# Patient Record
Sex: FEMALE | Race: WHITE
[De-identification: names, ages, dates, MRNs, and addresses within clinical notes are randomized per-mention and may not be internally consistent; named-entity substitution may affect disease eponyms.]

---

## 2018-11-27 ENCOUNTER — HOSPITAL ENCOUNTER (OUTPATIENT)
Age: 33
End: 2018-11-27
Payer: COMMERCIAL

## 2018-11-27 DIAGNOSIS — Z36.0: Primary | ICD-10-CM

## 2018-11-27 PROCEDURE — 76811 OB US DETAILED SNGL FETUS: CPT

## 2019-01-09 ENCOUNTER — HOSPITAL ENCOUNTER (OUTPATIENT)
Age: 34
End: 2019-01-09
Payer: COMMERCIAL

## 2019-01-09 DIAGNOSIS — Z34.90: Primary | ICD-10-CM

## 2019-01-09 PROCEDURE — 82947 ASSAY GLUCOSE BLOOD QUANT: CPT

## 2019-01-09 PROCEDURE — 36415 COLL VENOUS BLD VENIPUNCTURE: CPT

## 2019-03-14 ENCOUNTER — HOSPITAL ENCOUNTER (OUTPATIENT)
Age: 34
End: 2019-03-14
Payer: COMMERCIAL

## 2019-03-14 DIAGNOSIS — Z34.90: Primary | ICD-10-CM

## 2019-03-14 PROCEDURE — 86403 PARTICLE AGGLUT ANTBDY SCRN: CPT

## 2024-05-09 ENCOUNTER — TRANSCRIBE ORDERS (OUTPATIENT)
Dept: OBSTETRICS AND GYNECOLOGY | Facility: HOSPITAL | Age: 39
End: 2024-05-09
Payer: COMMERCIAL

## 2024-05-09 DIAGNOSIS — O09.522 MULTIGRAVIDA OF ADVANCED MATERNAL AGE IN SECOND TRIMESTER: ICD-10-CM

## 2024-05-09 DIAGNOSIS — Z34.90 PREGNANCY, UNSPECIFIED GESTATIONAL AGE: ICD-10-CM

## 2024-05-09 DIAGNOSIS — O35.9XX0 PREGNANCY AFFECTED BY MULTIPLE CONGENITAL ANOMALIES OF FETUS, SINGLE OR UNSPECIFIED FETUS: Primary | ICD-10-CM

## 2024-05-09 DIAGNOSIS — N96 RECURRENT PREGNANCY LOSS: ICD-10-CM

## 2024-05-13 ENCOUNTER — OFFICE VISIT (OUTPATIENT)
Dept: OBSTETRICS AND GYNECOLOGY | Facility: HOSPITAL | Age: 39
End: 2024-05-13
Payer: COMMERCIAL

## 2024-05-13 ENCOUNTER — HOSPITAL ENCOUNTER (OUTPATIENT)
Dept: WOMENS IMAGING | Facility: HOSPITAL | Age: 39
Discharge: HOME | End: 2024-05-13
Admitting: OBSTETRICS & GYNECOLOGY
Payer: COMMERCIAL

## 2024-05-13 VITALS
WEIGHT: 204 LBS | HEIGHT: 68 IN | DIASTOLIC BLOOD PRESSURE: 64 MMHG | BODY MASS INDEX: 30.92 KG/M2 | SYSTOLIC BLOOD PRESSURE: 132 MMHG

## 2024-05-13 DIAGNOSIS — O09.522 MULTIGRAVIDA OF ADVANCED MATERNAL AGE IN SECOND TRIMESTER: ICD-10-CM

## 2024-05-13 DIAGNOSIS — N96 RECURRENT PREGNANCY LOSS: ICD-10-CM

## 2024-05-13 DIAGNOSIS — O35.9XX0 PREGNANCY AFFECTED BY MULTIPLE CONGENITAL ANOMALIES OF FETUS, SINGLE OR UNSPECIFIED FETUS: ICD-10-CM

## 2024-05-13 DIAGNOSIS — Q89.7 MULTIPLE CONGENITAL ANOMALIES: ICD-10-CM

## 2024-05-13 DIAGNOSIS — O02.1 MISSED ABORTION: Primary | ICD-10-CM

## 2024-05-13 DIAGNOSIS — Z34.90 PREGNANCY, UNSPECIFIED GESTATIONAL AGE: ICD-10-CM

## 2024-05-13 PROCEDURE — 76815 OB US LIMITED FETUS(S): CPT | Performed by: OBSTETRICS & GYNECOLOGY

## 2024-05-13 PROCEDURE — 99203 OFFICE O/P NEW LOW 30 MIN: CPT | Performed by: OBSTETRICS & GYNECOLOGY

## 2024-05-13 PROCEDURE — 76815 OB US LIMITED FETUS(S): CPT

## 2024-05-13 RX ORDER — PRENATAL VIT/IRON FUM/FOLIC AC 27MG-0.8MG
TABLET ORAL DAILY
COMMUNITY

## 2024-05-13 NOTE — ASSESSMENT & PLAN NOTE
Unfortunately, today's ultrasound revealed a missed  with fetal size approximately 15 weeks 6 days overall (BPD 17 weeks 3 days). While the anatomic survey is highly limited, I am able to appreciate what I believe to be a cystic hygroma and pleural effusions. The long bones also appear shortened compared to head measurements (3 week lag).   I reviewed with the patient the risks and benefits of D&E vs IOL including failed IOL, retained placenta, hemorrhage, infection, surgical and anesthesia complications, inability to view infant with D&E. I am happy to assist with a D&E should that be what the patient desires. For now she is considering her options. She plans to travel for the next 3 days which I think is medically reasonable as long as she is in an area with access to medical care.   Regardless of method for management of missed Ab, I recommend sending a microarray  (ANORA miscarriage kit) for evaluation of fetal genetics. This can be sent from placenta/cord sample.   Happy to assist in any way.

## 2024-05-13 NOTE — LETTER
"May 13, 2024       No Recipients    Patient: Dora Walter   YOB: 1985   Date of Visit: 2024       Dear Rickie Calhoun MD,    Thank you for referring Dora Walter to me for evaluation. Below is a copy of my consult note.    If you have questions, please do not hesitate to call me. I look forward to following Dora along with you.         Sincerely,        Kathrin Mcgill MD        CC:   No Recipients        Maternal/Fetal Medicine New Patient Note     Name: Dora Walter    : 1985     MRN: [id number]     Referring Provider: Rickie Calhoun MD    Chief Complaint  multiple fetal anomalies    Subjective     History of Present Illness:  Dora Walter is a 38 y.o.  19w4d who presents today for evaluation in the setting of multiple congenital anomalies noted on outside ultrasound   NIPS previously declined     ODELL: Estimated Date of Delivery: 10/3/24     ROS:   As noted in HPI.     Past Medical History:   Diagnosis Date   • Hepatitis C antibody test negative     MOB Hep C and HepBsAg nonreactive; FOB recent diagnosis of Hepatitis C, \"low\" viral load   • History of congenital heart defect     MOB- went for repair at 25 yo- had closed, no surgery needed   • History of recurrent , currently pregnant     SAB x ~ 7   • History of stroke     with previous pregnancy, on lovenox/heparin; suspected T18, normal at birth   • History of transfusion     hemorrhage after miscarriage, received blood      Past Surgical History:   Procedure Laterality Date   • DILATATION AND CURETTAGE        OB History          16    Para   8    Term   8       0    AB   7    Living   8         SAB   7    IAB   0    Ectopic   0    Molar   0    Multiple   0    Live Births   8          Obstetric Comments   Fob#1 pregnancy #1-17               Current Outpatient Medications:   •  Prenatal Vit-Fe Fumarate-FA (prenatal vitamin 27-0.8) 27-0.8 MG tablet tablet, Take  by mouth Daily., Disp: , Rfl: " "    Objective     Vital Signs  /64   Ht 172.7 cm (68\")   Wt 92.5 kg (204 lb)   LMP 2023   Estimated body mass index is 31.02 kg/m² as calculated from the following:    Height as of this encounter: 172.7 cm (68\").    Weight as of this encounter: 92.5 kg (204 lb).    Ultrasound Impression:   See viewpoint    Assessment and Plan     Diagnoses and all orders for this visit:    1. Missed  (Primary)  Assessment & Plan:  Unfortunately, today's ultrasound revealed a missed  with fetal size approximately 15 weeks 6 days overall (BPD 17 weeks 3 days). While the anatomic survey is highly limited, I am able to appreciate what I believe to be a cystic hygroma and pleural effusions. The long bones also appear shortened compared to head measurements (3 week lag).   I reviewed with the patient the risks and benefits of D&E vs IOL including failed IOL, retained placenta, hemorrhage, infection, surgical and anesthesia complications, inability to view infant with D&E. I am happy to assist with a D&E should that be what the patient desires. For now she is considering her options. She plans to travel for the next 3 days which I think is medically reasonable as long as she is in an area with access to medical care.   Regardless of method for management of missed Ab, I recommend sending a microarray  (ANORA miscarriage kit) for evaluation of fetal genetics. This can be sent from placenta/cord sample.   Happy to assist in any way.         2. Multiple congenital anomalies         Follow Up  No follow-ups on file.    I spent 15 minutes caring for the patient on the day of service. This included: obtaining or reviewing a separately obtained medical history, reviewing patient records, performing a medically appropriate exam and/or evaluation, counseling or educating the patient/family/caregiver, ordering medications, labs, and/or procedures and documenting such in the medical record. This does not include time " spent on review and interpretation of other tests such as fetal ultrasound or the performance of other procedures such as amniocentesis or CVS.    Kathrin Mcgill MD FACOG  Maternal Fetal Medicine, [facility]     2024

## 2024-05-13 NOTE — PROGRESS NOTES
"    Maternal/Fetal Medicine New Patient Note     Name: Dora Walter    : 1985     MRN: [id number]     Referring Provider: Rcikie Calhoun MD    Chief Complaint  multiple fetal anomalies    Subjective     History of Present Illness:  Dora Walter is a 38 y.o.  19w4d who presents today for evaluation in the setting of multiple congenital anomalies noted on outside ultrasound   NIPS previously declined     ODELL: Estimated Date of Delivery: 10/3/24     ROS:   As noted in HPI.     Past Medical History:   Diagnosis Date    Hepatitis C antibody test negative     MOB Hep C and HepBsAg nonreactive; FOB recent diagnosis of Hepatitis C, \"low\" viral load    History of congenital heart defect     MOB- went for repair at 25 yo- had closed, no surgery needed    History of recurrent , currently pregnant     SAB x ~ 7    History of stroke     with previous pregnancy, on lovenox/heparin; suspected T18, normal at birth    History of transfusion     hemorrhage after miscarriage, received blood      Past Surgical History:   Procedure Laterality Date    DILATATION AND CURETTAGE        OB History          16    Para   8    Term   8       0    AB   7    Living   8         SAB   7    IAB   0    Ectopic   0    Molar   0    Multiple   0    Live Births   8          Obstetric Comments   Fob#1 pregnancy #1-17               Current Outpatient Medications:     Prenatal Vit-Fe Fumarate-FA (prenatal vitamin 27-0.8) 27-0.8 MG tablet tablet, Take  by mouth Daily., Disp: , Rfl:     Objective     Vital Signs  /64   Ht 172.7 cm (68\")   Wt 92.5 kg (204 lb)   LMP 2023   Estimated body mass index is 31.02 kg/m² as calculated from the following:    Height as of this encounter: 172.7 cm (68\").    Weight as of this encounter: 92.5 kg (204 lb).    Ultrasound Impression:   See viewpoint    Assessment and Plan     Diagnoses and all orders for this visit:    1. Missed  (Primary)  Assessment & " Plan:  Unfortunately, today's ultrasound revealed a missed  with fetal size approximately 15 weeks 6 days overall (BPD 17 weeks 3 days). While the anatomic survey is highly limited, I am able to appreciate what I believe to be a cystic hygroma and pleural effusions. The long bones also appear shortened compared to head measurements (3 week lag).   I reviewed with the patient the risks and benefits of D&E vs IOL including failed IOL, retained placenta, hemorrhage, infection, surgical and anesthesia complications, inability to view infant with D&E. I am happy to assist with a D&E should that be what the patient desires. For now she is considering her options. She plans to travel for the next 3 days which I think is medically reasonable as long as she is in an area with access to medical care.   Regardless of method for management of missed Ab, I recommend sending a microarray  (ANORA miscarriage kit) for evaluation of fetal genetics. This can be sent from placenta/cord sample.   Happy to assist in any way.         2. Multiple congenital anomalies         Follow Up  No follow-ups on file.    I spent 15 minutes caring for the patient on the day of service. This included: obtaining or reviewing a separately obtained medical history, reviewing patient records, performing a medically appropriate exam and/or evaluation, counseling or educating the patient/family/caregiver, ordering medications, labs, and/or procedures and documenting such in the medical record. This does not include time spent on review and interpretation of other tests such as fetal ultrasound or the performance of other procedures such as amniocentesis or CVS.    Kathrin Mcgill MD FACOG  Maternal Fetal Medicine, [facility]     2024